# Patient Record
Sex: MALE | URBAN - METROPOLITAN AREA
[De-identification: names, ages, dates, MRNs, and addresses within clinical notes are randomized per-mention and may not be internally consistent; named-entity substitution may affect disease eponyms.]

---

## 2020-10-01 ENCOUNTER — TELEPHONE (OUTPATIENT)
Dept: PULMONOLOGY | Age: 50
End: 2020-10-01

## 2020-10-01 ENCOUNTER — VIRTUAL VISIT (OUTPATIENT)
Dept: PULMONOLOGY | Age: 50
End: 2020-10-01
Payer: OTHER GOVERNMENT

## 2020-10-01 PROBLEM — I10 HYPERTENSION, ESSENTIAL: Status: ACTIVE | Noted: 2020-10-01

## 2020-10-01 PROBLEM — G47.33 OBSTRUCTIVE SLEEP APNEA SYNDROME: Status: ACTIVE | Noted: 2020-10-01

## 2020-10-01 PROBLEM — G47.33 OBSTRUCTIVE SLEEP APNEA SYNDROME: Chronic | Status: ACTIVE | Noted: 2020-10-01

## 2020-10-01 PROBLEM — N32.81 OAB (OVERACTIVE BLADDER): Chronic | Status: ACTIVE | Noted: 2020-10-01

## 2020-10-01 PROBLEM — I10 HYPERTENSION, ESSENTIAL: Chronic | Status: ACTIVE | Noted: 2020-10-01

## 2020-10-01 PROCEDURE — 99204 OFFICE O/P NEW MOD 45 MIN: CPT | Performed by: INTERNAL MEDICINE

## 2020-10-01 RX ORDER — NAPROXEN 500 MG/1
500 TABLET ORAL 2 TIMES DAILY WITH MEALS
COMMUNITY

## 2020-10-01 RX ORDER — LISINOPRIL 10 MG/1
10 TABLET ORAL DAILY
COMMUNITY

## 2020-10-01 RX ORDER — TROSPIUM CHLORIDE 20 MG/1
20 TABLET, FILM COATED ORAL 2 TIMES DAILY
COMMUNITY

## 2020-10-01 RX ORDER — TAMSULOSIN HYDROCHLORIDE 0.4 MG/1
0.4 CAPSULE ORAL 2 TIMES DAILY
COMMUNITY

## 2020-10-01 ASSESSMENT — SLEEP AND FATIGUE QUESTIONNAIRES
HOW LIKELY ARE YOU TO NOD OFF OR FALL ASLEEP WHILE SITTING INACTIVE IN A PUBLIC PLACE: 1
HOW LIKELY ARE YOU TO NOD OFF OR FALL ASLEEP WHILE WATCHING TV: 2
HOW LIKELY ARE YOU TO NOD OFF OR FALL ASLEEP WHILE LYING DOWN TO REST IN THE AFTERNOON WHEN CIRCUMSTANCES PERMIT: 3
ESS TOTAL SCORE: 17
HOW LIKELY ARE YOU TO NOD OFF OR FALL ASLEEP WHILE SITTING AND READING: 2
HOW LIKELY ARE YOU TO NOD OFF OR FALL ASLEEP WHILE SITTING QUIETLY AFTER LUNCH WITHOUT ALCOHOL: 3
HOW LIKELY ARE YOU TO NOD OFF OR FALL ASLEEP WHEN YOU ARE A PASSENGER IN A CAR FOR AN HOUR WITHOUT A BREAK: 3
HOW LIKELY ARE YOU TO NOD OFF OR FALL ASLEEP WHILE SITTING AND TALKING TO SOMEONE: 1
HOW LIKELY ARE YOU TO NOD OFF OR FALL ASLEEP IN A CAR, WHILE STOPPED FOR A FEW MINUTES IN TRAFFIC: 2

## 2020-10-01 NOTE — LETTER
[facility] Sleep Medicine  [place] [address] [place] [address]  Phone: [phone]  Fax: [phone]      October 1, 2020       Patient: Bradley Nolan   MR Number: <[id number]>   YOB: 1970   Date of Visit: 10/1/2020     Thank you for allowing me to participate in the care of Bradley Nolan. Here is my assessment and plan. Also attached is a copy of his consult note:    ASSESSMENT:  Visit Diagnoses and Associated Orders     Obstructive sleep apnea syndrome   (New Problem)  -  Primary    Need old records    Sleep Study with PAP Titration [[id number] Custom]   - Future Order         Hypertension, essential   (Stable)      lisinopril (PRINIVIL;ZESTRIL) 10 MG tablet [[id number]]           ORDERS WITHOUT AN ASSOCIATED DIAGNOSIS    tamsulosin (FLOMAX) 0.4 MG capsule [[id number]]      trospium (SANCTURA) 20 MG tablet [[id number]]      naproxen (NAPROSYN) 500 MG tablet [[id number]]            Plan: Will attempt to get old records. Supplies and parts as needed for his machine. These are medically necessary. Continue medications per his PCP and other physicians. Limit caffeine use after 3pm.  Encouraged him to work on weight loss through diet and exercise. The primary encounter diagnosis was Obstructive sleep apnea syndrome. A diagnosis of Hypertension, essential was also pertinent to this visit. The chronic medical conditions listed are directly related to the primary diagnosis listed above. The management of the primary diagnosis affects the secondary diagnosis and vice versa. Once we receive polysomnography will repeat CPAP titration. Needs new DME company and new Rx for supplies. 8 wk f/u after titration. Continue meds for: HTN. Pt would medically benefit from wt loss for KENDY (diet, exercise, surgical). Orders Placed This Encounter   Procedures    Sleep Study with PAP Titration         If you have questions or concerns, please do not hesitate to call me.  I look forward to following Radha along with you.     Sincerely,      Vivi Babin MD    CC providers:  Rani Joiner, [facility] [address] [place] [address] Avenue: [phone]

## 2020-10-01 NOTE — TELEPHONE ENCOUNTER
TALITA for pt letting him know we received his studies and Dr. Rodney Townsend needs the information from his unit. Pt was asked to call back to make arrangements to download S/D card.

## 2020-10-01 NOTE — PROGRESS NOTES
Scar Grace MD, [facility], [facility]  Tiffanie Kehrt [address]  3rd Floor,  [address] [facility], [facility] Blair Richardson E [phone]   [facility] [address] Jaylene Schroeder. [address] [place], [place] [address]Amara Perry 37 [phone]     Video Visit- Consult    Pursuant to the emergency declaration under the [address] [place] [address], [facility] waiver authority and the [employer] and [employer] Act, this Virtual  Visit was conducted, with patient's consent, to reduce the patient's risk of exposure to COVID-19. Services were provided through a video synchronous discussion virtually to substitute for in-person clinic visit. Patient was located in their home. Assessment:      Visit Diagnoses and Associated Orders     Obstructive sleep apnea syndrome   (New Problem)  -  Primary    Need old records    Sleep Study with PAP Titration [[id number] Custom]   - Future Order         Hypertension, essential   (Stable)      lisinopril (PRINIVIL;ZESTRIL) 10 MG tablet [[id number]]           ORDERS WITHOUT AN ASSOCIATED DIAGNOSIS    tamsulosin (FLOMAX) 0.4 MG capsule [[id number]]      trospium (SANCTURA) 20 MG tablet [[id number]]      naproxen (NAPROSYN) 500 MG tablet [5393]             Plan: Will attempt to get old records. Supplies and parts as needed for his machine. These are medically necessary. Continue medications per his PCP and other physicians. Limit caffeine use after 3pm.  Encouraged him to work on weight loss through diet and exercise. The primary encounter diagnosis was Obstructive sleep apnea syndrome. A diagnosis of Hypertension, essential was also pertinent to this visit. The chronic medical conditions listed are directly related to the primary diagnosis listed above. The management of the primary diagnosis affects the secondary diagnosis and vice versa. Once we receive polysomnography will repeat CPAP titration. Needs new DME company and new Rx for supplies.   8 wk f/u after titration. Continue meds for: HTN. Pt would medically benefit from wt loss for KENDY (diet, exercise, surgical). Orders Placed This Encounter   Procedures    Sleep Study with PAP Titration          Subjective:     Patient ID: Mariaa Becerra is a 53 y.o. male. Chief Complaint   Patient presents with    Sleep Apnea       HPI:      Mariaa Becerra is a 53 y.o. male referred by Leonor Aguilera MD for a sleep evaluation. He complains of: snoring, witnessed apneas, excessive daytime sleepiness , non-restorative sleep, nocturia, napping, drowsiness while driving and tossing and turning at night. He denies: cataplexy and hypnagogic hallucinations. Was Dx with obstructive sleep apnea in [place], put on CPAP and did well at first but now not as rested despite using his machine. At times will wake with mask off his face. Pressure feels off at time. Hypertension: stable on meds and followed by pt's PCP and other physicians. Previous evaluation and treatment has included- sleep studies in [facility], no old records at time of visit. DOT/CDL - No  FAA/'s license -No    Previous Report(s) Reviewed: historical medical records, office notes, andreferral letter(s). Pertinent data has been documented.     Wolverine - Total score: 17    Caffeine Intake - Ice tea: 4 glass(es) per day    Social History     Socioeconomic History    Marital status:      Spouse name: Not on file    Number of children: Not on file    Years of education: Not on file    Highest education level: Not on file   Occupational History    Not on file   Social Needs    Financial resource strain: Not on file    Food insecurity     Worry: Not on file     Inability: Not on file    Transportation needs     Medical: Not on file     Non-medical: Not on file   Tobacco Use    Smoking status: Never Smoker    Smokeless tobacco: Never Used   Substance and Sexual Activity    Alcohol use: Not on file    Drug use: Not on file    Sexual activity: Not on file   Lifestyle    Physical activity     Days per week: Not on file     Minutes per session: Not on file    Stress: Not on file   Relationships    Social connections     Talks on phone: Not on file     Gets together: Not on file     Attends Restorationism service: Not on file     Active member of club or organization: Not on file     Attends meetings of clubs or organizations: Not on file     Relationship status: Not on file    Intimate partner violence     Fear of current or ex partner: Not on file     Emotionally abused: Not on file     Physically abused: Not on file     Forced sexual activity: Not on file   Other Topics Concern    Not on file   Social History Narrative    Not on file        Current Outpatient Medications   Medication Sig Dispense Refill    lisinopril (PRINIVIL;ZESTRIL) 10 MG tablet Take 10 mg by mouth daily      tamsulosin (FLOMAX) 0.4 MG capsule Take 0.4 mg by mouth 2 times daily      trospium (SANCTURA) 20 MG tablet Take 20 mg by mouth 2 times daily      naproxen (NAPROSYN) 500 MG tablet Take 500 mg by mouth 2 times daily (with meals)       No current facility-administered medications for this visit. Allergies as of 10/01/2020 - Review Complete 10/01/2020   Allergen Reaction Noted    Erythromycin Rash 10/01/2020    Pcn [penicillins] Rash 10/01/2020       Patient Active Problem List   Diagnosis    Obstructive sleep apnea syndrome    Hypertension, essential    OAB (overactive bladder)       Past Medical History:   Diagnosis Date    Hypertension, essential 10/1/2020    OAB (overactive bladder) 10/1/2020    Obstructive sleep apnea syndrome 10/1/2020       History reviewed. No pertinent surgical history.     Family History   Problem Relation Age of Onset    Hypertension Father        Objective:     Vitals:  Patient reported Height and Weight Calculated BMI   Patient-Reported Vitals 10/1/2020   Patient-Reported Weight 230 lbs   Patient-Reported Height 5'11\"       32.1     Due to COVID-19 this was a virtual visit and physical exam was deferred.     Electronically signed by Rubio Patel MD on10/1/2020 at 2:39 PM

## 2020-10-08 ENCOUNTER — OFFICE VISIT (OUTPATIENT)
Dept: PRIMARY CARE CLINIC | Age: 50
End: 2020-10-08

## 2020-10-08 PROCEDURE — 99999 PR OFFICE/OUTPT VISIT,PROCEDURE ONLY: CPT | Performed by: NURSE PRACTITIONER

## 2020-10-08 NOTE — PROGRESS NOTES
Alex Valenzuela received a viral test for COVID-19. They were educated on isolation and quarantine as appropriate. For any symptoms, they were directed to seek care from their PCP, given contact information to establish with a doctor, directed to an urgent care or the emergency room.

## 2020-10-09 LAB — SARS-COV-2, NAA: NOT DETECTED

## 2020-10-13 NOTE — RESULT ENCOUNTER NOTE
Your Covid-10 teste resulted not detected/negative. What happens if I have a negative test?    Remember to wash your hands often, avoid touching your face, stay 6 feet from people you do not live with, and wear a cloth facemask when you go out in public. A negative COVID-19 test at one point in time does not mean you will stay negative. You could become ill with COVID-19 and/or test positive at any time. If you are a close contact of a confirmed or suspected case, continue to stay home and away from others until 14 days after your last exposure. If you do not have symptoms, and were not in close contact with a confirmed or suspected case, you can stop isolating. If you currently have symptoms of COVID-19, and were not in close contact with a confirmed or suspected case, you should keep monitoring symptoms and talk to your doctor or other healthcare provider about staying home and if you need to get tested again. If you develop symptoms of COVID-19, stay at home and away from others and talk to your doctor or other healthcare provider about getting tested again. For additional information, visit coronavirus. [place].gov. For answers to your COVID-19 questions, call 3-892-7-ASK-[facility] ([phone]).

## 2020-10-14 ENCOUNTER — HOSPITAL ENCOUNTER (OUTPATIENT)
Dept: SLEEP CENTER | Age: 50
Discharge: HOME | End: 2020-10-14
Payer: OTHER GOVERNMENT

## 2020-10-14 PROCEDURE — 95811 POLYSOM 6/>YRS CPAP 4/> PARM: CPT

## 2020-10-14 PROCEDURE — 95811 POLYSOM 6/>YRS CPAP 4/> PARM: CPT | Performed by: INTERNAL MEDICINE

## 2020-10-20 ENCOUNTER — TELEPHONE (OUTPATIENT)
Dept: PULMONOLOGY | Age: 50
End: 2020-10-20

## 2020-10-20 NOTE — TELEPHONE ENCOUNTER
Spoke with pt to review titration. Order to be sent to [facility] in [facility] per pt , due to locattion. F/U scheduled.

## 2020-10-20 NOTE — PROGRESS NOTES
Farhat Abraham         : 1970   [facility]    Diagnosis: [x] KENDY (G47.33) [] CSA (G47.31) [] Apnea (G47.30)   Length of Need: [x] 12 Months [] 99 Months [] Other:    Machine (GABBIE!): [x] Respironics Dream Station      Auto [] ResMed AirSense     Auto [] Other:     []  CPAP () [x] Bilevel ()   Mode: [] Auto [] Spontaneous    Mode: [x] Auto [] Spontaneous                IPAP max 25 cmH2O  EPAP min 12 cmH2O  PS min 3 cmH2O  P max 7 cmH2O             Comfort Settings:   - Ramp Pressure: 6 cmH2O                                        - Ramp time: 15 min                                     -  Flex/EPR - 3 full time                                    - For ResMed Bilevel (TiMax-4 sec   TiMin- 0.2 sec)     Humidifier: [x] Heated ()        [x] Water chamber replacement ()/ 1 per 6 months        Mask:   [] Nasal () /1 per 3 months [x] Full Face () /1 per 3 months   [] Patient choice -Size and fit mask [x] Patient Choice - Size and fit mask   [] Dispense:  [] Dispense:    [] Headgear () / 1 per 3 months [x] Headgear () / 1 per 3 months   [] Replacement Nasal Cushion ()/2 per month [x] Interface Replacement ()/1 per month   [] Replacement Nasal Pillows ()/2 per month         Tubing: [x] Heated ()/1 per 3 months    [] Standard ()/1 per 3 months [] Other:           Filters: [x] Non-disposable ()/1 per 6 months     [x] Ultra-Fine, Disposable ()/2 per month        Miscellaneous: [] Chin Strap ()/ 1 per 6 months [] O2 bleed-in:       LPM   [] Oximetry on CPAP/Bilevel []  Other:    [x] Modem: ()         Start Order Date: 10/20/20    MEDICAL JUSTIFICATION:  I, the undersigned, certify that the above prescribed supplies are medically necessary for this patients wellbeing. In my opinion, the supplies are both reasonable and necessary in reference to accepted standards of medicalpractice in treatment of this patients condition.     Toney Dorsey She Ann MD      NPI: [id number]       Order Signed Date: 10/20/20    Electronically signed by Tri Montaño MD on 10/20/2020 at 12:51 PM

## 2020-11-05 ENCOUNTER — TELEPHONE (OUTPATIENT)
Dept: PULMONOLOGY | Age: 50
End: 2020-11-05

## 2020-11-05 NOTE — TELEPHONE ENCOUNTER
Pt's spouse mentioned that he has not been contacted by his DME.  Pt to call DME order was sent 10/20/2020

## 2020-11-09 ENCOUNTER — TELEPHONE (OUTPATIENT)
Dept: PULMONOLOGY | Age: 50
End: 2020-11-09

## 2021-01-13 ENCOUNTER — TELEPHONE (OUTPATIENT)
Dept: PULMONOLOGY | Age: 51
End: 2021-01-13

## 2021-01-13 ENCOUNTER — VIRTUAL VISIT (OUTPATIENT)
Dept: PULMONOLOGY | Age: 51
End: 2021-01-13
Payer: OTHER GOVERNMENT

## 2021-01-13 DIAGNOSIS — I10 HYPERTENSION, ESSENTIAL: Chronic | ICD-10-CM

## 2021-01-13 DIAGNOSIS — G47.33 OBSTRUCTIVE SLEEP APNEA SYNDROME: Primary | Chronic | ICD-10-CM

## 2021-01-13 PROCEDURE — 99214 OFFICE O/P EST MOD 30 MIN: CPT | Performed by: NURSE PRACTITIONER

## 2021-01-13 ASSESSMENT — SLEEP AND FATIGUE QUESTIONNAIRES
ESS TOTAL SCORE: 12
HOW LIKELY ARE YOU TO NOD OFF OR FALL ASLEEP WHEN YOU ARE A PASSENGER IN A CAR FOR AN HOUR WITHOUT A BREAK: 1
HOW LIKELY ARE YOU TO NOD OFF OR FALL ASLEEP IN A CAR, WHILE STOPPED FOR A FEW MINUTES IN TRAFFIC: 1

## 2021-01-13 NOTE — ASSESSMENT & PLAN NOTE
Reviewed compliance download with pt. Supplies and parts as needed for his machine. These are medically necessary. Continue medications per his PCP and other physicians. Limit caffeine use after 3pm.  Encouraged him to work on weight loss through diet and exercise. The encounter diagnosis was Hypertension, essential.  The chronic medical conditions listed are directly related to the primary diagnosis listed above. The management of the primary diagnosis affects the secondary diagnosis and vice versa.

## 2021-01-13 NOTE — PROGRESS NOTES
Mikel Rowley MD, FAASM, [facility]  Ruth Camacho, MSN, RN, CNP     [address] SLEEP MEDICINE  [place] [address] [address] [address]  Dept: [phone]  Dept Fax: [phone]: [facility]  [address] [place] [address] [phone]    Subjective:     Patient ID: Tesha Church is a 53 y.o. male. Chief Complaint   Patient presents with    Sleep Apnea       HPI:      Sleep Medicine Video Visit    Pursuant to the emergency declaration under the [address] [place] [address], [facility] waiver authority and the [employer] and [employer] Act this Telephone Visit was insisted, with patient's consent, to reduce the patient's risk of exposure to COVID-19 and provide continuity of care for an established patient. Services were provided through a synchronous discussion over a telephone and/or Video chat to substitute for in-person clinic visit, and coded as such. While patient is at home. Machine Modem/Download Info:  Compliance (hours/night): 7 hrs/night  Download AHI (/hour): 3.7 /HR     Average IPAP Pressure: 16.3 cmH2O  Average EPAP Pressure: 12.1 cmH2O         AUTO BIPAP - Settings (Jacqueline)  IPAP Max: 25 cmH2O  EPAP Min: 12 cmH2O  Pressure Support Min: 3  Pressure Support Max: 8             Comfort Settings  Humidity Level (0-8): 3  Flex/EPR (0-3): 3 PAP Mask  Mask Type: Full Face mask  Clinically Relevant Leak: No     Hachita - Total score: 12    Follow-up :     Last Visit : October 2020    Titration done on 10/20/2020      Patient reports the listed chronic Co-morbidities: HTN   are well controlled and stable at this time.      Subjective Health Changes: None      Over Night Oximetry: [] Yes  [] No  [x] NA [] WNL   Using O2: [] Yes  [] No  [x] NA   Patient is compliant with the machine  [x] Yes  [] No   Feeling rested when using the machine   [x] Yes  [] No     Pressure is comfortable with inspiration and expiration  [x] Yes  [] No     Noticed changes in pressure   [] Yes  [] No  [x] NA   Mask is fitting well  [x] Yes  [] No   Noting Mask Air Leak  [] Yes  [x] No   Having painful Aerophagia  [] Yes  [x] No   Nocturia   1  per night. Having  HA upon waking  [] Yes  [x] No   Dry mouth upon waking   Dry Nose  Dry Eyes  [] Yes  [x] No   Congestion upon waking   [] Yes  [x] No    Nose Bleeds  [] Yes  [x] No   Using Sleep Aides    [x] NA   Understands how to change humidification and/or tubing temperature for comfort while at home  [x] Yes  [] No     Difficulties falling asleep  [] Yes  [x] No   Difficulties staying asleep  [] Yes  [x] No   Approximate time to bed  11pm   Approximate wake time  7:30am   Taking Naps  occasional   If taking naps usual length  20 min    If taking naps using the machine  [] Yes  [x] No  [] NA [] With and With out    Drowsy when driving  [] Yes  [x] No     Does patient carry a DOT/CDL  [] Yes  [x] No     Does patient carry FAA/[employer] License   [] Yes  [x] No      Any concerns noted with the machine at this time  [] Yes  [x] No        Diagnosis Orders   1. Obstructive sleep apnea syndrome     2. Hypertension, essential         The chronic medical conditions listed are directly related to the primary diagnosis listed above. The management of the primary diagnosis affects the secondary diagnosis and vice versa. Assessment/Plan:     Hypertension, essential  Chronic- Stable. Cont meds per PCP and other physicians. Obstructive sleep apnea syndrome  Reviewed compliance download with pt. Supplies and parts as needed for his machine. These are medically necessary. Continue medications per his PCP and other physicians. Limit caffeine use after 3pm.  Encouraged him to work on weight loss through diet and exercise.   The encounter diagnosis was Hypertension, essential.  The chronic medical conditions listed

## 2021-01-13 NOTE — PROGRESS NOTES
21    Electronically signed by ALVARO Dooley CNP on 2021 at 2:26 PM    Bj Gomez  1970  [address]  [phone] (home)   There is no such number on file (mobile). Insurance Info (confirm with patient if correct):  Payor/Plan Subscr  Sex Relation Sub.  Ins. ID Effective Group Num